# Patient Record
Sex: FEMALE | Race: WHITE | NOT HISPANIC OR LATINO | Employment: OTHER | URBAN - METROPOLITAN AREA
[De-identification: names, ages, dates, MRNs, and addresses within clinical notes are randomized per-mention and may not be internally consistent; named-entity substitution may affect disease eponyms.]

---

## 2021-03-02 ENCOUNTER — CATARACT CONSULT (OUTPATIENT)
Dept: URBAN - METROPOLITAN AREA CLINIC 39 | Facility: CLINIC | Age: 61
End: 2021-03-02

## 2021-03-02 DIAGNOSIS — H25.812: ICD-10-CM

## 2021-03-02 DIAGNOSIS — H25.811: ICD-10-CM

## 2021-03-02 DIAGNOSIS — H04.123: ICD-10-CM

## 2021-03-02 DIAGNOSIS — H43.813: ICD-10-CM

## 2021-03-02 DIAGNOSIS — H18.513: ICD-10-CM

## 2021-03-02 PROCEDURE — 92136TC50 INTERFEROMETRY - TECHNICAL COMPONENT

## 2021-03-02 PROCEDURE — 92025-2 CORNEAL TOPOGRAPHY, PT

## 2021-03-02 PROCEDURE — 92286 ANT SGM IMG I&R SPECLR MIC: CPT

## 2021-03-02 PROCEDURE — 92134 CPTRZ OPH DX IMG PST SGM RTA: CPT

## 2021-03-02 PROCEDURE — 92004 COMPRE OPH EXAM NEW PT 1/>: CPT

## 2021-03-02 PROCEDURE — 92250 FUNDUS PHOTOGRAPHY W/I&R: CPT

## 2021-03-02 PROCEDURE — V2799PMN IMPRIMIS PRED-MOXI-NEPAF 5ML

## 2021-03-02 ASSESSMENT — TONOMETRY
OD_IOP_MMHG: 12
OS_IOP_MMHG: 10

## 2021-03-02 ASSESSMENT — VISUAL ACUITY
OS_BAT: 20/200
OS_SC: J1+
OD_SC: J1+
OS_CC: 20/30-2
OD_CC: J2
OD_CC: 20/30-1+1
OS_CC: J4
OD_BAT: 20/70-1
OS_SC: 20/200

## 2021-04-26 ENCOUNTER — CATARACT CONSULT (OUTPATIENT)
Dept: URBAN - METROPOLITAN AREA CLINIC 35 | Facility: CLINIC | Age: 61
End: 2021-04-26

## 2021-04-26 DIAGNOSIS — H25.091: ICD-10-CM

## 2021-04-26 DIAGNOSIS — H25.092: ICD-10-CM

## 2021-04-26 PROCEDURE — 92136TC INTERFEROMETRY - TECHNICAL COMPONENT

## 2021-04-26 PROCEDURE — 92014 COMPRE OPH EXAM EST PT 1/>: CPT

## 2021-04-26 ASSESSMENT — VISUAL ACUITY
OD_BAT: 20/100
OD_CC: 20/30
OS_SC: J1
OS_CC: 20/25
OD_CC: J4
OS_CC: J3
OD_SC: 20/400
OD_SC: J1
OS_BAT: 20/100
OS_SC: 20/400

## 2021-04-26 ASSESSMENT — TONOMETRY
OD_IOP_MMHG: 11
OS_IOP_MMHG: 11

## 2023-01-17 NOTE — PATIENT DISCUSSION
****UPDATE PER 58 B[address] Puente PT ELECTS BASIC+ YULI OU/ UNDERSTANDS AND ACCEPTS NEED FOR GLASSES AT ALL DISTANCES FOR BCVA**Tentative plan: [facility].  no van or med clearance. put drops through pharmacy is not going through us.